# Patient Record
Sex: MALE | Race: WHITE | NOT HISPANIC OR LATINO | Employment: FULL TIME | URBAN - METROPOLITAN AREA
[De-identification: names, ages, dates, MRNs, and addresses within clinical notes are randomized per-mention and may not be internally consistent; named-entity substitution may affect disease eponyms.]

---

## 2021-03-08 ENCOUNTER — HOSPITAL ENCOUNTER (EMERGENCY)
Facility: CLINIC | Age: 41
Discharge: HOME | End: 2021-03-08
Attending: NURSE PRACTITIONER | Admitting: NURSE PRACTITIONER
Payer: COMMERCIAL

## 2021-03-08 VITALS
SYSTOLIC BLOOD PRESSURE: 133 MMHG | OXYGEN SATURATION: 99 % | TEMPERATURE: 97.9 F | DIASTOLIC BLOOD PRESSURE: 89 MMHG | RESPIRATION RATE: 16 BRPM | HEART RATE: 65 BPM | WEIGHT: 166.8 LBS

## 2021-03-08 DIAGNOSIS — S61.011A LACERATION OF RIGHT THUMB WITHOUT FOREIGN BODY WITHOUT DAMAGE TO NAIL, INITIAL ENCOUNTER: ICD-10-CM

## 2021-03-08 PROCEDURE — 99282 EMERGENCY DEPT VISIT SF MDM: CPT | Mod: 25 | Performed by: NURSE PRACTITIONER

## 2021-03-08 PROCEDURE — 12002 RPR S/N/AX/GEN/TRNK2.6-7.5CM: CPT | Performed by: NURSE PRACTITIONER

## 2021-03-08 PROCEDURE — 99283 EMERGENCY DEPT VISIT LOW MDM: CPT | Performed by: NURSE PRACTITIONER

## 2021-03-09 NOTE — ED PROVIDER NOTES
History     Chief Complaint   Patient presents with     Laceration     HPI  Casey J Cantu is a 40 year old male who presents to the emergency department for evaluation of laceration to his right thumb.  This occurred just prior to arrival at home.  He was cut by a piece of steel.  Bleeding is controlled.  Last tetanus on file is , the patient states he believes he [place] in the [place] 3 or 4 years.    Allergies:  Allergies   Allergen Reactions     No Known Drug Allergies        Problem List:    Patient Active Problem List    Diagnosis Date Noted     Family history of malignant neoplasm of prostate 2004     Priority: Medium        Past Medical History:    Past Medical History:   Diagnosis Date     NO ACTIVE PROBLEMS        Past Surgical History:    Past Surgical History:   Procedure Laterality Date     HC REMOVAL OF TONSILS,<11 Y/O         Family History:    Family History   Problem Relation Age of Onset     Prostate Cancer Father      Heart Disease Paternal Grandmother         heart surgery     Heart Disease Paternal Uncle         CAD, 54 y.o. at onset     Lipids Father         diet       Social History:  Marital Status:   [2]  Social History     Tobacco Use     Smoking status: Former Smoker     Years: 0.50     Quit date: 9/15/2000     Years since quittin.4     Tobacco comment: no smokers in the household   Substance Use Topics     Alcohol use: Yes     Comment: occ     Drug use: No        Medications:    FEXOFENADINE  MG OR TABS          Review of Systems  As mentioned above in the history present illness. All other systems were reviewed and are negative.    Physical Exam   BP: 133/89  Pulse: 65  Temp: 97.9  F (36.6  C)  Resp: 16  Weight: 75.7 kg (166 lb 12.8 oz)  SpO2: 99 %      Physical Exam  Appearance: Alert, oriented, no acute distress.  MUSC: RIGHT THUMB--full range of motion, normal strength, normal sensation.  Skin: 3 cm laceration dorsal  right thumb over the DIP joint.  Description: clean wound edges, no foreign bodies.   Neuro: Neurovascular and tendon structures are intact.   warm and pink.      ED Course        [facility]    -Laceration Repair    Date/Time: 3/9/2021 1:14 AM  Performed by: uJlee Diego APRN CNP  Authorized by: Julee Diego APRN CNP     LACERATION DETAILS     Location:  Finger    Finger location:  R thumb    Length (cm):  3    REPAIR TYPE:     Repair type:  Simple      EXPLORATION:     Wound exploration: wound explored through full range of motion and entire depth of wound probed and visualized      TREATMENT:     Area cleansed with:  Saline    SKIN REPAIR     Repair method:  Sutures    Suture size:  3-0    Suture technique:  Simple interrupted    Number of sutures:  6    APPROXIMATION     Approximation:  Close    POST-PROCEDURE DETAILS     Dressing:  Tube gauze      PROCEDURE   Patient Tolerance:  Patient tolerated the procedure well with no immediate complications                     No results found for this or any previous visit (from the past 24 hour(s)).    Medications - No data to display    Assessments & Plan (with Medical Decision Making)   Change dressing tomorrow, and twice a day going forward.  Use splint to protect stitches.  Sutures out in 10 days.  Call your clinic tomorrow to check when you had tetanus last updated, if less than 7-10 years you should get this updated this week.  Recheck sooner for any signs of infection (increased redness, swelling or pain)    I have reviewed the nursing notes.    I have reviewed the findings, diagnosis, plan and need for follow up with the patient.      Discharge Medication List as of 3/8/2021  9:26 PM          Final diagnoses:   Laceration of right thumb without foreign body without damage to nail, initial encounter       3/8/2021   [facility] EMERGENCY DEPT     Julee Diego APRN CNP  03/09/21  [id number]

## 2021-03-09 NOTE — DISCHARGE INSTRUCTIONS
Change dressing tomorrow, and twice a day going forward.  Use splint to protect stitches.  Sutures out in 10 days.  Call your clinic tomorrow to check when you had tetanus last updated, if less than 7-10 years you should get this updated this week.  Recheck sooner for any signs of infection (increased redness, swelling or pain)

## 2021-03-18 ENCOUNTER — ALLIED HEALTH/NURSE VISIT (OUTPATIENT)
Dept: FAMILY MEDICINE | Facility: CLINIC | Age: 41
End: 2021-03-18
Payer: COMMERCIAL

## 2021-03-18 VITALS — TEMPERATURE: 98.4 F

## 2021-03-18 DIAGNOSIS — Z48.02 VISIT FOR SUTURE REMOVAL: Primary | ICD-10-CM

## 2021-03-18 PROCEDURE — 99207 PR NO CHARGE NURSE ONLY: CPT

## 2021-03-18 NOTE — PROGRESS NOTES
Casey J Cantu presents to the clinic today for removal of sutures.  The patient has had the sutures in place for 10 days.  There has been no history of infection or drainage.  5 sutures are seen located on the R thumb.  The wound is healing well with no signs of infection.  Tetanus status is up to date.   All sutures were easily removed today.  Routine wound care discussed.  The patient will follow up as needed. NOTE: He said he did call is former clinic and his tetanus status was such that he was good on his tetanus shot.   3 steri-strips were applied as stitches were right over the knuckle and he says he is working on his basement still.  The steri strips will come off on  Their own. NO need to schedule appt for removal. You may shower and wash hands with them on.  Call if further quesrtions/ concerns.  ZANE Barakat

## 2022-06-26 ENCOUNTER — HOSPITAL ENCOUNTER (EMERGENCY)
Facility: CLINIC | Age: 42
Discharge: HOME | End: 2022-06-26
Attending: PHYSICIAN ASSISTANT | Admitting: PHYSICIAN ASSISTANT
Payer: COMMERCIAL

## 2022-06-26 VITALS
DIASTOLIC BLOOD PRESSURE: 83 MMHG | RESPIRATION RATE: 18 BRPM | BODY MASS INDEX: 25.01 KG/M2 | WEIGHT: 165 LBS | HEIGHT: 68 IN | SYSTOLIC BLOOD PRESSURE: 129 MMHG | TEMPERATURE: 97.9 F | HEART RATE: 68 BPM | OXYGEN SATURATION: 100 %

## 2022-06-26 DIAGNOSIS — S61.012A LACERATION OF LEFT THUMB: ICD-10-CM

## 2022-06-26 PROCEDURE — 12001 RPR S/N/AX/GEN/TRNK 2.5CM/<: CPT | Performed by: PHYSICIAN ASSISTANT

## 2022-06-26 PROCEDURE — 99282 EMERGENCY DEPT VISIT SF MDM: CPT | Mod: 25 | Performed by: PHYSICIAN ASSISTANT

## 2022-06-26 PROCEDURE — 99282 EMERGENCY DEPT VISIT SF MDM: CPT | Performed by: PHYSICIAN ASSISTANT

## 2022-06-26 NOTE — ED PROVIDER NOTES
"  History     Chief Complaint   Patient presents with     Laceration       HPI  Casey J Cantu is a 42 year old male who presents to the emergency department for concerns of a laceration to the left thumb.  Patient reports he was working on replacing the blades of his lawnmower when his left thumb slipped and hit the mower deck, sustaining a laceration.  He is able to move the finger okay though it is sore around the wound.  He denies any other injuries.  Last tetanus in 2013-ish when he lived in [facility].        Allergies:  Allergies   Allergen Reactions     No Known Drug Allergies        Problem List:    Patient Active Problem List    Diagnosis Date Noted     Family history of malignant neoplasm of prostate 12/30/2004     Priority: Medium        Past Medical History:    Past Medical History:   Diagnosis Date     NO ACTIVE PROBLEMS        Past Surgical History:    Past Surgical History:   Procedure Laterality Date     HC REMOVAL OF TONSILS,<13 Y/O         Family History:    Family History   Problem Relation Age of Onset     Prostate Cancer Father      Heart Disease Paternal Grandmother         heart surgery     Heart Disease Paternal Uncle         CAD, 54 y.o. at onset     Lipids Father         diet       Social History:  Marital Status:   [2]  Social History     Substance Use Topics     Alcohol use: Yes     Comment: occ     Drug use: No        Medications:    FEXOFENADINE  MG OR TABS          Review of Systems   All other systems reviewed and are negative.      Physical Exam   BP: 126/86  Pulse: 72  Temp: 97.9  F (36.6  C)  Resp: 18  Height: 172.7 cm (5' 8\")  Weight: 74.8 kg (165 lb)  SpO2: 96 %      Physical Exam  Vitals and nursing note reviewed.   Constitutional:       General: He is not in acute distress.     Appearance: Normal appearance. He is not ill-appearing, toxic-appearing or diaphoretic.   HENT:      Head: Normocephalic and atraumatic.   Eyes:      Extraocular Movements: " Extraocular movements intact.      Conjunctiva/sclera: Conjunctivae normal.   Cardiovascular:      Rate and Rhythm: Normal rate and regular rhythm.      Pulses: Normal pulses.   Pulmonary:      Effort: Pulmonary effort is normal. No respiratory distress.      Breath sounds: Normal breath sounds.   Musculoskeletal:        Hands:       Cervical back: Neck supple.      Comments: Left thumb 2 cm horizontally aligned laceration along the radial/lateral aspect of the IP joint.  Bleeding controlled with direct pressure.  Normal range of motion of the thumb.  No underlying bony tenderness.  No other injuries demonstrated to the left hand.   Skin:     General: Skin is warm and dry.   Neurological:      General: No focal deficit present.      Mental Status: He is alert and oriented to person, place, and time. Mental status is at baseline.   Psychiatric:         Mood and Affect: Mood normal.         Behavior: Behavior normal.         ED [facility]    -Laceration Repair    Date/Time: 6/26/2022 4:58 PM  Performed by: Jo Duenas PA-C  Authorized by: Jo Duenas PA-C     Risks, benefits and alternatives discussed.    LACERATION DETAILS     Location:  Finger    Finger location:  L thumb    Length (cm):  2    REPAIR TYPE:     Repair type:  Simple      EXPLORATION:     Wound exploration: wound explored through full range of motion and entire depth of wound probed and visualized      Wound extent: no nerve damage, no tendon damage, no underlying fracture and no vascular damage      TREATMENT:     Area cleansed with:  Soap and water    Amount of cleaning:  Standard    Irrigation solution:  Sterile water    SKIN REPAIR     Repair method:  Sutures    Suture size:  4-0    Suture material:  Nylon    Suture technique:  Simple interrupted    Number of sutures:  4    APPROXIMATION     Approximation:  Close    POST-PROCEDURE DETAILS     Dressing:  Antibiotic ointment and  adhesive bandage        PROCEDURE    Patient Tolerance:  Patient tolerated the procedure well with no immediate complications        No results found for this or any previous visit (from the past 24 hour(s)).    Medications   Tdap (tetanus-diphtheria-acell pertussis) (ADACEL) injection 0.5 mL (0.5 mLs Intramuscular Not Given 6/26/22 1642)       Assessments & Plan (with Medical Decision Making)  Casey J Cantu is a 42 year old male who presents to the ED with a laceration to the right thumb sustained while working on his mower.  Denies any other injuries.  2 cm horizontally aligned laceration along the IP joint noted.  No significant bleeding from wound noted.  Wound was repaired as detailed above.  I recommended updating his tetanus today but he declined.  No underlying bony tenderness.  He was advised to have stitches removed in 1 week.  He was provided instructions on wound cares as well as indications of when to return to the ED.  All questions answered and patient discharged home in suitable condition.     I have reviewed the nursing notes.    I have reviewed the findings, diagnosis, plan and need for follow up with the patient.    New Prescriptions    No medications on file       Final diagnoses:   Laceration of left thumb     Note: Chart documentation done in part with Dragon Voice Recognition software. Although reviewed after completion, some word and grammatical errors may remain.     6/26/2022   [facility] EMERGENCY DEPT     Jo Duenas PA-C  06/26/22 [id number]

## 2022-06-26 NOTE — ED TRIAGE NOTES
Pt presents with concerns of a laceration on the left thumb.  Pt states within in the last hour he hit it on the lawn mower deck when he was trying to do a repair.  Tetanus last in [id number] when in [place].     Triage Assessment     Row Name 06/26/22 4798       Triage Assessment (Adult)    Airway WDL WDL       Respiratory WDL    Respiratory WDL WDL       Skin Circulation/Temperature WDL    Skin Circulation/Temperature WDL WDL       Cardiac WDL    Cardiac WDL WDL       Peripheral/Neurovascular WDL    Peripheral Neurovascular WDL WDL       Cognitive/Neuro/Behavioral WDL    Cognitive/Neuro/Behavioral WDL WDL

## 2022-06-26 NOTE — DISCHARGE INSTRUCTIONS
Please keep the wound covered with an antibiotic ointment and bandage.  Wash with warm soapy water but do not submerge underwater until stitches are removed.  Have the stitches removed in 1 week.  If you develop any worsening concerns such as signs of infection please return to the emergency department.     Thank you for choosing [facility]'s Emergency Department. It was a pleasure taking care of you today. If you have any questions, please call [phone].    Jo Duenas PA-C

## 2022-07-05 ENCOUNTER — ALLIED HEALTH/NURSE VISIT (OUTPATIENT)
Dept: FAMILY MEDICINE | Facility: CLINIC | Age: 42
End: 2022-07-05
Payer: COMMERCIAL

## 2022-07-05 DIAGNOSIS — Z48.02 VISIT FOR SUTURE REMOVAL: Primary | ICD-10-CM

## 2022-07-05 PROCEDURE — 99207 PR NO CHARGE NURSE ONLY: CPT

## 2022-07-05 NOTE — PROGRESS NOTES
Casey J Cantu presents to the clinic for removal of sutures and sutures,staples, steri strips. The patient has had sutures in place for 10 days. There has been no patient reported signs or symptoms of infection or drainage. 4  sutures and sutures,staples, staple, steri strips are seen and located on the L thumb. Tetanus status is up to date. All sutures and sutures,staples, steri strips were easily removed today. Routine wound care discussed by the RN or provider. The patient will follow up as needed.    Radha España RN